# Patient Record
Sex: FEMALE | Race: OTHER | URBAN - METROPOLITAN AREA
[De-identification: names, ages, dates, MRNs, and addresses within clinical notes are randomized per-mention and may not be internally consistent; named-entity substitution may affect disease eponyms.]

---

## 2017-05-13 ENCOUNTER — EMERGENCY (EMERGENCY)
Facility: HOSPITAL | Age: 27
LOS: 1 days | Discharge: HOME | End: 2017-05-13
Attending: EMERGENCY MEDICINE | Admitting: EMERGENCY MEDICINE
Payer: COMMERCIAL

## 2017-05-13 VITALS
HEART RATE: 80 BPM | RESPIRATION RATE: 18 BRPM | OXYGEN SATURATION: 100 % | DIASTOLIC BLOOD PRESSURE: 82 MMHG | TEMPERATURE: 98 F | SYSTOLIC BLOOD PRESSURE: 122 MMHG

## 2017-05-13 DIAGNOSIS — S63.615A UNSPECIFIED SPRAIN OF LEFT RING FINGER, INITIAL ENCOUNTER: ICD-10-CM
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
## 2017-05-13 DIAGNOSIS — J45.909 UNSPECIFIED ASTHMA, UNCOMPLICATED: ICD-10-CM

## 2017-05-13 DIAGNOSIS — S60.445A EXTERNAL CONSTRICTION OF LEFT RING FINGER, INITIAL ENCOUNTER: ICD-10-CM

## 2017-05-13 PROCEDURE — 99282 EMERGENCY DEPT VISIT SF MDM: CPT | Mod: 25

## 2017-05-13 NOTE — ED ADULT NURSE NOTE - CHIEF COMPLAINT
The patient is a 27y Female complaining of The patient is a 27y Female complaining of ring stuck on finger

## 2017-05-13 NOTE — ED ADULT NURSE NOTE - OBJECTIVE STATEMENT
27 year old female with co ring stuck on finger. (+) redness swelling multiple attempts made home to remove ring unable to go past knuckle. attempted to take off here. ring cut by NP ice applied. finger pink warm no numbness tingling cap refill less than 2 seconds no other complaints will continue to monitor

## 2017-05-14 PROCEDURE — 99282 EMERGENCY DEPT VISIT SF MDM: CPT

## 2017-05-14 RX ORDER — ACETAMINOPHEN 500 MG
975 TABLET ORAL ONCE
Refills: 0 | Status: COMPLETED | OUTPATIENT
Start: 2017-05-14 | End: 2017-05-14

## 2017-05-14 RX ADMIN — Medication 975 MILLIGRAM(S): at 00:26

## 2017-05-14 NOTE — ED PROVIDER NOTE - ATTENDING CONTRIBUTION TO CARE
27yoF with ring stuck on finger.   On exam, edema, pain with manipulation of ring.   A: Finger injury/unable to remove ring  -ring removed with ring cutter. pain meds.

## 2017-05-14 NOTE — ED PROVIDER NOTE - PHYSICAL EXAMINATION
NAD, VSS, Afebrile, Lungs clear, + left ring finger tender, swelling with a sing stuck around PIP, intact skin. N/V- intact.

## 2017-05-14 NOTE — ED PROVIDER NOTE - OBJECTIVE STATEMENT
26yo female pt, ambulatory c/o a ring stuck in left ring finger today. Pt stated she attempted to remove it but the swelling got worsen. Denies sensory changes or weakness to fingers.

## 2018-12-29 NOTE — ED ADULT NURSE NOTE - TEMPLATE
Telephone Encounter by Maria Eugenia Smith MD at 12/27/17 04:40 PM     Author:  Maria Eugenia Smith MD Service:  (none) Author Type:  Physician     Filed:  12/27/17 04:40 PM Encounter Date:  12/27/2017 Status:  Signed     :  Maria Eugenia Smith MD (Physician)            Miralax daily, avoid spicy and greasy foods.  Er if symptoms worsen[RA1.1M]      Revision History        User Key Date/Time User Provider Type Action    > RA1.1 12/27/17 04:40 PM Maria Eugenia Smith MD Physician Sign    M - Manual             General

## 2019-01-07 PROBLEM — Z00.00 ENCOUNTER FOR PREVENTIVE HEALTH EXAMINATION: Status: ACTIVE | Noted: 2019-01-07

## 2019-01-08 ENCOUNTER — APPOINTMENT (OUTPATIENT)
Dept: OBGYN | Facility: CLINIC | Age: 29
End: 2019-01-08

## 2019-04-09 NOTE — ED PROVIDER NOTE - NS HIV RISK FACTOR YES
[facility] AFTER HOURS COLLECTION FOR [facility] PERFORMED AT [facility]  
Declined

## 2021-03-15 ENCOUNTER — TRANSCRIPTION ENCOUNTER (OUTPATIENT)
Age: 31
End: 2021-03-15